# Patient Record
(demographics unavailable — no encounter records)

---

## 2024-10-21 NOTE — PHYSICAL EXAM
[General Appearance - Alert] : alert [General Appearance - In No Acute Distress] : in no acute distress [General Appearance - Well Nourished] : well nourished [] : normal voice and communication [Oriented To Time, Place, And Person] : oriented to person, place, and time [Impaired Insight] : insight and judgment were intact [Affect] : the affect was normal [Memory Recent] : recent memory was not impaired [Abnormal Walk] : normal gait [3+] : Brachioradialis left 3+ [1+] : Ankle jerk left 1+ [Normal] : normal [Romberg's Sign] : Romberg's sign was negtive [Landry] : Landry's sign was not demonstrated

## 2024-10-21 NOTE — DATA REVIEWED
[de-identified] : C-spine wo n 7/25/24 @ R  EXAM:  MRI CERVICAL SPINE WITHOUT CONTRAST  HISTORY:  Neck pain.  TECHNIQUE:  Multiplanar, multi-sequential MRI of the cervical spine was obtained on a 3T scanner using a standard protocol.  COMPARISON:  None.  FINDINGS: Alignment is within normal limits. Desiccation of C2-C3 through C5-C6 discs. No acute compression fracture.  C1-C2, there are mild degenerative changes of the atlantoodontoid articulation. No canal or foraminal stenosis.  C2-C3, shallow central protrusion does not contact cord. Foramina patent.  C3-C4, shallow annular bulge contacts but does not compress the cord. Mild LEFT foraminal stenosis. RIGHT foramen patent.  C4-C5, severe LEFT foraminal stenosis from uncovertebral arthrosis and facet arthropathy. Disc osteophyte with annular bulge contacts but does not compress the cord. RIGHT foramen patent.  C5-C6, severe RIGHT foraminal stenosis from uncovertebral arthrosis and facet arthropathy. Disc osteophyte with annular bulge does not contact cord. Moderate LEFT foraminal stenosis.  C6-C7, C7-T1, no canal or foraminal stenosis.  Cervical cord shows no signal abnormality. Cerebellar tonsils are in normal position. Paravertebral soft tissues demonstrate no significant abnormality. Pituitary gland is partially flattened along the sellar floor.  IMPRESSION:   1.  C4-C5 severe LEFT foraminal stenosis. Correlate for LEFT C5 radiculopathy. 2.  C5-C6 severe RIGHT foraminal stenosis. Correlate for RIGHT C6 radiculopathy.   Thank you for the opportunity to participate in the care of this patient.     KYLIE MCGOVERN MD  - Electronically Signed: 07- 1:10 AM  Physician to Physician Direct Line is: [phone]

## 2024-10-21 NOTE — DATA REVIEWED
[de-identified] : C-spine wo n 7/25/24 @ R  EXAM:  MRI CERVICAL SPINE WITHOUT CONTRAST  HISTORY:  Neck pain.  TECHNIQUE:  Multiplanar, multi-sequential MRI of the cervical spine was obtained on a 3T scanner using a standard protocol.  COMPARISON:  None.  FINDINGS: Alignment is within normal limits. Desiccation of C2-C3 through C5-C6 discs. No acute compression fracture.  C1-C2, there are mild degenerative changes of the atlantoodontoid articulation. No canal or foraminal stenosis.  C2-C3, shallow central protrusion does not contact cord. Foramina patent.  C3-C4, shallow annular bulge contacts but does not compress the cord. Mild LEFT foraminal stenosis. RIGHT foramen patent.  C4-C5, severe LEFT foraminal stenosis from uncovertebral arthrosis and facet arthropathy. Disc osteophyte with annular bulge contacts but does not compress the cord. RIGHT foramen patent.  C5-C6, severe RIGHT foraminal stenosis from uncovertebral arthrosis and facet arthropathy. Disc osteophyte with annular bulge does not contact cord. Moderate LEFT foraminal stenosis.  C6-C7, C7-T1, no canal or foraminal stenosis.  Cervical cord shows no signal abnormality. Cerebellar tonsils are in normal position. Paravertebral soft tissues demonstrate no significant abnormality. Pituitary gland is partially flattened along the sellar floor.  IMPRESSION:   1.  C4-C5 severe LEFT foraminal stenosis. Correlate for LEFT C5 radiculopathy. 2.  C5-C6 severe RIGHT foraminal stenosis. Correlate for RIGHT C6 radiculopathy.   Thank you for the opportunity to participate in the care of this patient.     KYLIE MCGOVERN MD  - Electronically Signed: 07- 1:10 AM  Physician to Physician Direct Line is: [phone]

## 2024-10-21 NOTE — ASSESSMENT
[FreeTextEntry1] : PLAN - PT, Pain management  - CT C-spine wo - EMG upper extremities - f/u after images/EMG to review  I, Dr. Josh Swain, personally performed the evaluation and management (E/M) services for this new patient. That E/M includes conducting the clinically appropriate initial history &/or exam, assessing all conditions, and establishing the plan of care. Today, my TESHA, Hyunchu Eli-Gold, was here to observe my evaluation and management service for this patient & follow plan of care established by me going forward.

## 2024-10-21 NOTE — HISTORY OF PRESENT ILLNESS
[FreeTextEntry1] : RIGHT arm/hand pain/numbness/paresthesia for one year [de-identified] : 66 yo RH F with PMH of HTN, HCL, uterine cancer, HIV (CD count 700's, antibody undetectable) reports progressively worsening R side arm/hand pain/numbness/paresthesia for one year initially started without injury. She describes symptoms are intermittent worsening by prolonged sitting and alleviated by muscle relaxant (Flexeril). She denies weakness, balance problem, BB dysfunction or any other focal neuro deficits.  MRI C-spine was completed by PCP which showed severe C4-6 foraminal stenosis and referred to neuro sx for further evaluation.

## 2024-10-21 NOTE — HISTORY OF PRESENT ILLNESS
[FreeTextEntry1] : RIGHT arm/hand pain/numbness/paresthesia for one year [de-identified] : 68 yo RH F with PMH of HTN, HCL, uterine cancer, HIV (CD count 700's, antibody undetectable) reports progressively worsening R side arm/hand pain/numbness/paresthesia for one year initially started without injury. She describes symptoms are intermittent worsening by prolonged sitting and alleviated by muscle relaxant (Flexeril). She denies weakness, balance problem, BB dysfunction or any other focal neuro deficits.  MRI C-spine was completed by PCP which showed severe C4-6 foraminal stenosis and referred to neuro sx for further evaluation.

## 2024-12-10 NOTE — ASSESSMENT
[FreeTextEntry1] :   PLAN AND RECOMMENDATIONS :   We discussed differential diagnosis and clinical impression agree with EMG  rule out ulnar neuropathy  vs double crush    Recommend .symptomatic care and support  medications start gabapentin gets severe muscle spasm s at night   imaging done    hydrotherapy /heat / cold for pain  continue  ergonomic precautions including pacing ,posture and frequent breaks while typing.  relative rest and avoidance of painful activity where possible  increasing activity as discussed  return for EMG  Information given to patient about EMG and Nerve Conduction Study Examination including  planning, differential diagnosis to rule in /rule out ,duration of the test ,precautions (if patient on blood thinner.has bleeding disorder or  pace maker device etc -still possible to undergo with care), side effects(benign-limited to short term bruising and discomfort/pain)   The protocol of temp checks upon arrival ,disinfection procedure of waiting room and the lab explained- reassured.  All questions answered.  Patient instructed to book appointment upon conclusion of appointment  Information sheet ' Answers to your Questions on EMG " forwarded to patient to read prior to testing, with further information about training,background and the procedure itself . start gabapentin explained rationale

## 2024-12-10 NOTE — PHYSICAL EXAM
[FreeTextEntry1] : PHYSICAL EXAM : OBJECTIVE   GENERAL : Awake ,alert and oriented to time place and person  HEAD : normocephalic and atraumatic  NECK : supple ,no tracheal deviation ,no thyroid enlargement noted with swallowing EYES : sclera and conjunctiva normal no redness,intact extraocular movements  ENT  : ears and nose normal in appearance -hearing adequate  PULMONARY: effort normal. No respiratory distress. breathing regular. No wheezes  LYMPH : No swelling in limbs, capillary return within normal range  CVS : warm extremities,no palpitations,not short of breath, no visible jugular venous distention PSYCH : mood and affect normal ,good eye contact ,normal attention  ABDOMEN : no visible distension ,  NEUROLOGICAL:cranial nerves intact,muscle tone normal,gait and balance safe except where noted below  SKIN : warm and dry No rash detected over specific body areas examined  MUSCULOSKELETAL: normal muscle bulk, no focal bony tenderness /posture normal except where specified below + Tinels R elbow  PADa nd DAB 4/5 R  numb pinky on and off R  SRI NL

## 2024-12-10 NOTE — HISTORY OF PRESENT ILLNESS
[FreeTextEntry1] :  Ms. VIDHYA HERNANDEZ is a very pleasant 67-year RHD female who comes in for evaluation of Right side of the neck radiating down her right shoulder that has been ongoing for proximally a year  without any specific injury or inciting event.  the medial fingers 4 th and 5th get numb  The pain is located primarily right neck intermittent in nature and described as dull, achy, burning, throbbing, cramping and shooting. The pain is rated as 4/10 during today's visit, and ranges from 8/10. The patient's symptoms are aggravated by no specific reason, sometimes sitting and alleviated by muscles relaxer. Patient feels numbness and tingling sensation on her last two fingers. Patient denies any bowel/bladder dysfunction. The patient has no other complaints at this time.   Patient has not tried any treatment  MRI Cervical spine shows: 1.  C4-C5 severe LEFT foraminal stenosis. Correlate for LEFT C5 radiculopathy. 2.  C5-C6 severe RIGHT foraminal stenosis. Correlate for RIGHT C6 radiculopathy. she is retired admin

## 2024-12-10 NOTE — CONSULT LETTER
[FreeTextEntry1] : Dear Dr. MIMA LUCERO  ,   I had the pleasure of evaluating your patient, VIDHYA HERNANDEZ .  Thank you very much for allowing me to participate in the care of this patient. If you have any questions, please do not hesitate to contact me.   Sincerely,  Silke Almaraz MD   ABPMR Board Certified in Physical Medicine and Rehabilitation Certified Fellow of AANEM (Neuromuscular and Electrodiagnostic Medicine) Subspecialty certified in Sports Medicine (ABPMR)   of Physical Medicine and Rehabilitation [facility] [employer] [facility]

## 2024-12-10 NOTE — REVIEW OF SYSTEMS
[Patient Intake Form Reviewed] : Patient intake form was reviewed [Joint Pain] : joint pain [Muscle Weakness] : muscle weakness [Fever] : no fever [Chills] : no chills [Difficulty Walking] : no difficulty walking

## 2024-12-20 NOTE — PROCEDURE
[de-identified] : EMG /NERVE CONDUCTION STUDY performed today without complication  Tabulated data, wave forms , conclusions and recommendations are attached and in the procedure report.  Please refer to the scanned study attached to this encounter  Full history and focused clinical exam performed prior to the examination and documented in report